# Patient Record
Sex: FEMALE | Race: WHITE
[De-identification: names, ages, dates, MRNs, and addresses within clinical notes are randomized per-mention and may not be internally consistent; named-entity substitution may affect disease eponyms.]

---

## 2020-01-15 ENCOUNTER — HOSPITAL ENCOUNTER (OUTPATIENT)
Dept: HOSPITAL 92 - L&D/OP | Age: 20
Discharge: HOME | End: 2020-01-15
Attending: OBSTETRICS & GYNECOLOGY
Payer: COMMERCIAL

## 2020-01-15 VITALS — TEMPERATURE: 99 F | DIASTOLIC BLOOD PRESSURE: 79 MMHG | SYSTOLIC BLOOD PRESSURE: 115 MMHG

## 2020-01-15 VITALS — BODY MASS INDEX: 26.7 KG/M2

## 2020-01-15 DIAGNOSIS — G40.909: ICD-10-CM

## 2020-01-15 DIAGNOSIS — B96.89: ICD-10-CM

## 2020-01-15 DIAGNOSIS — Z3A.38: ICD-10-CM

## 2020-01-15 DIAGNOSIS — B37.3: ICD-10-CM

## 2020-01-15 DIAGNOSIS — O23.593: ICD-10-CM

## 2020-01-15 DIAGNOSIS — O99.353: ICD-10-CM

## 2020-01-15 DIAGNOSIS — O98.813: Primary | ICD-10-CM

## 2020-01-15 DIAGNOSIS — Z79.899: ICD-10-CM

## 2020-01-15 LAB — AMNISURE INTERNAL CONTROL QC: (no result)

## 2020-01-15 PROCEDURE — 99285 EMERGENCY DEPT VISIT HI MDM: CPT

## 2020-01-15 PROCEDURE — 87660 TRICHOMONAS VAGIN DIR PROBE: CPT

## 2020-01-15 PROCEDURE — 87510 GARDNER VAG DNA DIR PROBE: CPT

## 2020-01-15 PROCEDURE — 87480 CANDIDA DNA DIR PROBE: CPT

## 2020-01-15 PROCEDURE — 84112 EVAL AMNIOTIC FLUID PROTEIN: CPT

## 2020-01-15 NOTE — PRG
DATE OF SERVICE:  01/15/2020



TIME:  2106.



STERILE SPECULUM EXAMINATION:  I explained to the patient and her family members the

process of a sterile spec exam.  The AmniSure returned negative.  I performed a

sterile spec to confirm that the AmniSure was valid.  I do not see any true evidence

of leakage with the speculum exam and the Valsalva and cough were not impressive.

However, I suspect that she may have a mild yeast infection, so I collected a VPIII

and we are calling the lab to make sure that this is being run [facility].  If it is

not run [facility], she will have to follow up tomorrow for these results again. 







Job ID:  [id number]

## 2020-01-15 NOTE — PDOC.EVN
Event Note





- Event Note


Event Note: 





VP3 with BV and yeast:


will give difllucan here and write a RX for flagyl x 7 days

## 2020-01-15 NOTE — HP
TIME OF EVALUATION:  Roughly 2045.



LOCATION:  Triage. 



This is a patient of Dr. Shi.



CHIEF COMPLAINT:  This is a patient who complains of "leakage" after being checked

earlier today in the office by Dr. Shi, who called her 2-3 cm dilated. 



HISTORY OF PRESENT ILLNESS:  This is a 19-year-old, G1, P0, with a known history of

epilepsy (stable), who is on Keppra, who presents now with an EDC of January 27th,

putting her at 38 weeks and 2 days, who states that she was checked in the office

today and has a slow amount of leakage, but she denies a large gush of fluid or

vaginal bleeding.  She has good fetal movement.  She denies fevers. 



REVIEW OF SYSTEMS:  Complete review of systems was checked and is otherwise negative

unless specified in the HPI. 



PAST MEDICAL HISTORY:  Significant for her epilepsy, for which she takes Keppra.



PAST SURGICAL HISTORY:  Tonsils and adenoids.



ALLERGIES:  NONE.



OB HISTORY:  She is a G1, P0.



PHYSICAL EXAMINATION:

VITAL SIGNS:  Her blood pressure is 115/75, temperature is 99.0, her pulse is in the

90s, although it was initially 120 when she first arrived, possibly due to slight

anxiety.  Her O2 saturation is now 100% on room air, and respirations are 18 and

nonlabored. 

ABDOMEN:  Soft, nontender. 

PELVIC:  Reveals a cervix of 2 cm dilation, 50% effaced, -2 and posterior (this was

by the RN prior to calling me). 



Sterile speculum exam is pending as I am awaiting a speculum. 



Interventions ordered.  I have ordered an AmniSure and that is pending. 



Fetal monitor, fetal heart tones are category 1 and reassuring with a normal rate of

about 130 to 140.  Contractions are irregular and sporadic contractions. 



ASSESSMENT:  This is a 19-year-old, G1, P0, at early term (38 weeks and 2 days) with

"leakage" after exam today.  Leakage of fluid may be part of the gel, which was used

on the exam, or maybe rupture of membranes.  I have ordered an AmniSure and sterile

speculum to assess. 



PLAN:  

1. AmniSure sent and pending.

2. Sterile speculum exam about to be performed.

3. We will admit if there is concern for rupture of membranes.







Job ID:  [id number]

## 2020-01-24 ENCOUNTER — HOSPITAL ENCOUNTER (INPATIENT)
Dept: HOSPITAL 92 - L&D/OP | Age: 20
LOS: 2 days | Discharge: HOME | End: 2020-01-26
Attending: OBSTETRICS & GYNECOLOGY | Admitting: OBSTETRICS & GYNECOLOGY
Payer: COMMERCIAL

## 2020-01-24 VITALS — BODY MASS INDEX: 26.7 KG/M2

## 2020-01-24 DIAGNOSIS — Z3A.39: ICD-10-CM

## 2020-01-24 DIAGNOSIS — D72.829: ICD-10-CM

## 2020-01-24 LAB
HBSAG INDEX: 0.25 S/CO (ref 0–0.99)
HGB BLD-MCNC: 12 G/DL (ref 12–16)
MCH RBC QN AUTO: 24.7 PG (ref 25–35)
MCV RBC AUTO: 75.9 FL (ref 78–98)
PLATELET # BLD AUTO: 267 THOU/UL (ref 130–400)
RBC # BLD AUTO: 4.85 MILL/UL (ref 4–5.2)
SYPHILIS ANTIBODY INDEX: 0.03 S/CO
WBC # BLD AUTO: 20.4 THOU/UL (ref 4.8–10.8)

## 2020-01-24 PROCEDURE — 87340 HEPATITIS B SURFACE AG IA: CPT

## 2020-01-24 PROCEDURE — 36415 COLL VENOUS BLD VENIPUNCTURE: CPT

## 2020-01-24 PROCEDURE — 85027 COMPLETE CBC AUTOMATED: CPT

## 2020-01-24 PROCEDURE — 99285 EMERGENCY DEPT VISIT HI MDM: CPT

## 2020-01-24 PROCEDURE — 85025 COMPLETE CBC W/AUTO DIFF WBC: CPT

## 2020-01-24 PROCEDURE — 86900 BLOOD TYPING SEROLOGIC ABO: CPT

## 2020-01-24 PROCEDURE — 86901 BLOOD TYPING SEROLOGIC RH(D): CPT

## 2020-01-24 PROCEDURE — 86780 TREPONEMA PALLIDUM: CPT

## 2020-01-24 PROCEDURE — 86850 RBC ANTIBODY SCREEN: CPT

## 2020-01-24 PROCEDURE — 0KQM0ZZ REPAIR PERINEUM MUSCLE, OPEN APPROACH: ICD-10-PCS | Performed by: OBSTETRICS & GYNECOLOGY

## 2020-01-24 RX ADMIN — DOCUSATE CALCIUM SCH MG: 240 CAPSULE, LIQUID FILLED ORAL at 19:48

## 2020-01-24 NOTE — OP
DATE OF PROCEDURE:  01/24/2020



PREOPERATIVE DIAGNOSIS:  Term labor, Dr. Caleb Potter patient with precipitous

progress to delivery. 



POSTOPERATIVE DIAGNOSIS:  Term labor, Dr. Caleb Potter patient with precipitous

progress to delivery. 



PROCEDURE PERFORMED:  Spontaneous vaginal delivery with second-degree midline

laceration, remainder of repair completed by Dr. Caleb Shi. 



DESCRIPTION OF PROCEDURE:  The patient was a patient of Dr. Caleb Shi who is 
an

active labor, progressed rapidly to complete complete.  Once she began pushing

despite being a primigravida, she proceeded to +4 station in a very rapid 
course.  I

presented to the room at approximately 4:39 p.m. after Dr. Shi had been 
called for

delivery.  The patient was prepped and draped in the usual manner and she 
proceeded

to deliver spontaneously over a midline laceration at 4:42 p.m.  With a vigorous

male infant, Apgars and weight are pending.  Umbilical cord was clamped and cut 
in

the usual manner after delay of approximately a minute and the placenta 
delivered

intact.  A 10 mL of lidocaine was injected on each side of the midline 
laceration

for repair.  Dr. Caleb Shi presented at this time and she completed the 
repair of

the laceration.  ORQUIDEA on further notes under Dr. Shi. 







Job ID:  [id number]



[facility]

## 2020-01-24 NOTE — PDOC.OPDEL
OB Operative/Delivery Note


Delivery Dr/Surgeon: Ronnie/Jose Guadalupe


Pre-Delivery Diagnosis: active labor


Weeks gestation: 39


Anesthesia: local





- Findings


  ** A


Sex: male


Apgar - 1 min: 9


Apgar - 5 min: 9





- Additional Findings/Plan


Placenta delivered: spontaneous


Repaired Obstetrical Laceration: 2nd degree


Estimated blood loss: 250ml


Compilations/Other Findings: 





Arrived w baby w mom delivered by Dr. Trujillo and pt ready for repair of 2nd 

degree, at which time I scrubbed in.


Post delivery plan: routine recovery

## 2020-01-24 NOTE — PDOC.LDHP
Labor and Delivery H&P


Chief complaint: contractions


HPI: 





Pt is a 20yo G1 @ 39 weeks who presents in active labor.


Current gestational age (weeks): 39


Due date: 20


Dating criteria: first trimester ultrasound (8 week US)


Grav: 1


Current pregnancy complications: none


Past Medical History: 





seizure disorder, medication noncompliance (Keppra)


Current medications: pre-patricia vitamins, other (irregular Keppra use)


Allergies/Adverse Reactions: 


 Allergies











Allergy/AdvReac Type Severity Reaction Status Date / Time


 


No Known Allergies Allergy   Verified 01/15/20 20:18











Social history: drug use (remote hx of MJ use, neg UDS in preg)





- Physical Exam


Vital signs reviewed and normal: yes


General: NAD


Lungs: nonlabored breathing


Abdomen: gravid


Extremeties: no edema


FHT: category 1





- Vaginal Exam


cm dilated: 6


Effacement: 75%





- OB Labs


Blood type: A


RH: positive


Antibody Screen: negative


HIV: negative


RPR: negative


HEPSAg: negative


1 hour GCT: negative


GBS: negative


Urine drug screen: negative


Rubella: immune





- Assessment


L&D Assessment: term patient in labor





- Plan


Plan: admit to L&D, labor augmentation if indicated, informed consent obtained, 

anesthesia consult for pain management

## 2020-01-25 VITALS — TEMPERATURE: 98.1 F

## 2020-01-25 LAB
BASOPHILS # BLD AUTO: 0 THOU/UL (ref 0–0.2)
BASOPHILS NFR BLD AUTO: 0.1 % (ref 0–1)
EOSINOPHIL # BLD AUTO: 0 THOU/UL (ref 0–0.7)
EOSINOPHIL NFR BLD AUTO: 0.2 % (ref 0–10)
HGB BLD-MCNC: 9.9 G/DL (ref 12–16)
LYMPHOCYTES # BLD: 3.8 THOU/UL (ref 1.2–3.4)
LYMPHOCYTES NFR BLD AUTO: 17.7 % (ref 28–48)
MCH RBC QN AUTO: 25 PG (ref 25–35)
MCV RBC AUTO: 76 FL (ref 78–98)
MONOCYTES # BLD AUTO: 1.9 THOU/UL (ref 0.11–0.59)
MONOCYTES NFR BLD AUTO: 8.9 % (ref 0–4)
NEUTROPHILS # BLD AUTO: 15.8 THOU/UL (ref 1.4–6.5)
NEUTROPHILS NFR BLD AUTO: 73.1 % (ref 31–61)
PLATELET # BLD AUTO: 283 THOU/UL (ref 130–400)
RBC # BLD AUTO: 3.96 MILL/UL (ref 4–5.2)
WBC # BLD AUTO: 21.7 THOU/UL (ref 4.8–10.8)

## 2020-01-25 RX ADMIN — DOCUSATE CALCIUM SCH MG: 240 CAPSULE, LIQUID FILLED ORAL at 08:40

## 2020-01-25 RX ADMIN — DOCUSATE CALCIUM SCH MG: 240 CAPSULE, LIQUID FILLED ORAL at 21:10

## 2020-01-25 NOTE — PDOC.PP
Post Partum Progress Note


Post Partum Day #: 1


Subjective: 





Patient doing well. No significant overnight events. Tolerating PO. Lochia 

minimal. 


PO intake tolerated: yes


Flatus: yes


Ambulation: yes


 Vital Signs (12 hours)











  Temp Pulse Resp BP BP Pulse Ox


 


 20 23:10  98.8 F  116 H  18  99/56 L  


 


 20 21:20   113 H    113/57 L 


 


 20 20:30  99.4 F  114 H    


 


 20 20:15  99.9 F H  111 H  20   109/61  99


 


 20 19:20  98.9 F  108 H  18   124/70  99








 Weight











Weight                         70.76 kg

















- Physical Examination


General: NAD


Deviation from normal: RRR during my examination


Respiratory: non-labored breathing


Abdominal: lochia (minimal), no distention, appropriately TTP


Fundus firm & at: just above umbilicus


Skin: no rash


Neurological: no gross focal deficits


Psychiatric: A&Ox3, normal affect


Result Diagrams: 


 20 15:08





Additional Labs: 


 Post Partum Labs











Blood Type  A POSITIVE   20  17:25    


 


Hep Bs Antigen  Non-Reactive S/CO (NonReactive)   20  15:08    











(1) Term pregnancy delivered


Code(s): O80 - ENCOUNTER FOR FULL-TERM UNCOMPLICATED DELIVERY   Status: Acute   





(2) Vaginal delivery


Code(s): O80 - ENCOUNTER FOR FULL-TERM UNCOMPLICATED DELIVERY   Status: Acute   





- Assessment/Plan





Routine PP care


- PP day #1 s/p uncomplicated 


- Rubella immune, Rh positive


- Meeting all PP milestones


- GBS negative





2nd degree perineal laceration


- s/p repair, hemostatic





Seizure disorder


- Noncompliant with Keppra





Tachycardia, appears resolved


- Pulse 108-116, repeat prior to exam 86


- BP low 100's systolic


- Leukocytosis noted on initial CBC, will repeat


- Afebrile, temp high to 99.9


- Patient asymptomatic





Dispo: Stable. Plan for d/c home tomorrow.

## 2020-01-26 VITALS — SYSTOLIC BLOOD PRESSURE: 106 MMHG | DIASTOLIC BLOOD PRESSURE: 56 MMHG

## 2020-01-26 RX ADMIN — DOCUSATE CALCIUM SCH MG: 240 CAPSULE, LIQUID FILLED ORAL at 09:07
